# Patient Record
Sex: FEMALE | URBAN - METROPOLITAN AREA
[De-identification: names, ages, dates, MRNs, and addresses within clinical notes are randomized per-mention and may not be internally consistent; named-entity substitution may affect disease eponyms.]

---

## 2019-04-19 ENCOUNTER — OFFICE VISIT (OUTPATIENT)
Dept: URBAN - METROPOLITAN AREA CLINIC 32 | Facility: CLINIC | Age: 24
End: 2019-04-19
Payer: COMMERCIAL

## 2019-04-19 DIAGNOSIS — H52.223 REGULAR ASTIGMATISM, BILATERAL: ICD-10-CM

## 2019-04-19 PROCEDURE — 92004 COMPRE OPH EXAM NEW PT 1/>: CPT | Performed by: OPTOMETRIST

## 2019-04-19 ASSESSMENT — VISUAL ACUITY
OS: 20/20
OD: 20/20

## 2019-04-19 ASSESSMENT — INTRAOCULAR PRESSURE
OD: 7
OS: 9

## 2019-04-19 NOTE — IMPRESSION/PLAN
Impression: Corneal Opacity - Bilateral: H17.13.  Plan: RTC 2 months aure and refraction, pt considering enhancement

## 2019-06-21 ENCOUNTER — OFFICE VISIT (OUTPATIENT)
Dept: URBAN - METROPOLITAN AREA CLINIC 32 | Facility: CLINIC | Age: 24
End: 2019-06-21
Payer: COMMERCIAL

## 2019-06-21 DIAGNOSIS — H17.13 CORNEAL OPACITY - BILATERAL: Primary | ICD-10-CM

## 2019-06-21 PROCEDURE — 92310 CONTACT LENS FITTING OU: CPT | Performed by: OPTOMETRIST

## 2019-06-21 ASSESSMENT — KERATOMETRY
OS: 38.87
OD: 39.39

## 2019-06-21 ASSESSMENT — INTRAOCULAR PRESSURE
OS: 10
OD: 10

## 2019-06-21 ASSESSMENT — VISUAL ACUITY
OS: 20/20
OD: 20/30

## 2019-07-26 ENCOUNTER — OFFICE VISIT (OUTPATIENT)
Dept: URBAN - METROPOLITAN AREA CLINIC 32 | Facility: CLINIC | Age: 24
End: 2019-07-26
Payer: COMMERCIAL

## 2019-07-26 PROCEDURE — V2799 MISC VISION ITEM OR SERVICE: HCPCS | Performed by: OPTOMETRIST

## 2019-07-26 ASSESSMENT — VISUAL ACUITY
OS: 20/20
OD: 20/20

## 2019-07-26 ASSESSMENT — KERATOMETRY
OD: 39.48
OS: 39.06

## 2019-07-26 ASSESSMENT — INTRAOCULAR PRESSURE
OS: 8
OD: 13

## 2019-07-26 NOTE — IMPRESSION/PLAN
Impression: Corneal Opacity - Bilateral: H17.13.  Plan: irregular cornea OD, Rx shifting monitor before any enhancement consideration